# Patient Record
Sex: MALE | Race: WHITE | NOT HISPANIC OR LATINO | URBAN - METROPOLITAN AREA
[De-identification: names, ages, dates, MRNs, and addresses within clinical notes are randomized per-mention and may not be internally consistent; named-entity substitution may affect disease eponyms.]

---

## 2024-01-02 ENCOUNTER — APPOINTMENT (OUTPATIENT)
Dept: ORTHOPEDIC SURGERY | Facility: HOSPITAL | Age: 49
End: 2024-01-02
Payer: COMMERCIAL

## 2024-01-09 ENCOUNTER — APPOINTMENT (OUTPATIENT)
Dept: ORTHOPEDIC SURGERY | Facility: HOSPITAL | Age: 49
End: 2024-01-09
Payer: COMMERCIAL

## 2024-03-31 NOTE — PROGRESS NOTES
Subjective    Patient ID: Jerry Hassan is a 48 y.o. male.    Chief Complaint: No chief complaint on file.     Last Surgery: No surgery found  Last Surgery Date: No surgery found    HPI  JERRY HASSAN is a 46 year old right hand dominant male presenting today for evaluation of their left shoulder. He explains that he has had pain for a few months. He notes that he had another episode of the same pain five years ago that resolved on its own. He saw his PCP and was started on a round of antiinflammatories which did not help. Physical therapy did not help. He localized his pain to the AC joint. He has noticed the pain is exacerbated by working out and particularly with chest press.    04/02/24  Jerry returns to the clinic for a follow up visit regarding his left shoulder.     He reports that about a month ago his shoulder began getting worse. His wife signed them up for a  and they had him use bands. The night of working out he explains he went to bed and rolled over, then felt a sharp pain in his left shoulder.      Past medical history, surgical history, social history, and family history were all reviewed and are as per the [facility] patient health history questionnaire form that I signed and scanned into the chart today.   Objective   Ortho Exam  Patient is a well-developed, well-nourished male in no acute distress.  Breathes with normal chest rises.  Pupils are round and symmetric today.  Awake, alert, and oriented x3.      Examination of the right shoulder today reveals the skin to be intact. There is no sign of any atrophy, lesions, or abrasions. There is no pain to palpation of the bony prominences. Cervical lymphadenopathy examined, and this was negative. Patient had 5 out of 5 wrist flexion, extension, and thumb extension, bilaterally. Sensation was intact to light touch to median, ulnar, radial, axillary, and musculocutaneous nerves bilaterally. Positive radial pulse bilaterally.  Provocative maneuvers on the right side today were negative.  Range of motion of the right shoulder revealed 0-170° of forward elevation, 0-60° of external rotation, and internal rotation up to T-12.     Examination of the left shoulder today reveals the skin to be intact. There is no sign of any atrophy, lesions, or abrasions. There is no pain to palpation of the bony prominences. Cervical lymphadenopathy examined, and this was negative. Patient had 5 out of 5 wrist flexion, extension, and thumb extension bilaterally. Sensation was intact to light touch to median, ulnar, radial axillary, and musculocutaneous nerves bilaterally. Positive radial pulse bilaterally. Provocative maneuvers on the left side today were negative.  Range of motion of the left shoulder revealed 0-170° of forward elevation, 0-60° of external rotation, and internal rotation was to T-12.   Pain with Mert's testing.       Image Results:  SHOULDER  MRN: [id number]  Patient Name: JERRY HASSAN     STUDY:  Left shoulder, 4 views.     INDICATION:  left shoulder pain.  M25.512: Left shoulder pain.     COMPARISON:  None.     ACCESSION NUMBER(S):  [id number]     ORDERING CLINICIAN:  MCKAYLA BERMAN     FINDINGS:  No acute fracture or malalignment.  Resorptive changes of the distal clavicle likely representing distal  clavicle osteolysis.  No significant degenerative changes.  Soft tissues are unremarkable.     IMPRESSION:  1. Findings suggesting distal clavicle osteolysis/chronic repetitive  stress injury. Correlate with patient's overhead activity levels.      Assessment/Plan   Encounter Diagnoses:  No diagnosis found.  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     We discussed his shoulder again today. We provided him with exercises to do at home daily. He will follow up with us as needed if he finds no relief with the exercises.     No orders of the defined types were placed in this encounter.    Follow  up as needed    Scribe Attestation  By signing my name below, I, Belle Wilkerson , Scribemanuel   attest that this documentation has been prepared under the direction and in the presence of Jose Alejandro Connelly MD.

## 2024-04-02 ENCOUNTER — HOSPITAL ENCOUNTER (OUTPATIENT)
Dept: RADIOLOGY | Facility: HOSPITAL | Age: 49
Discharge: HOME | End: 2024-04-02
Payer: COMMERCIAL

## 2024-04-02 ENCOUNTER — OFFICE VISIT (OUTPATIENT)
Dept: ORTHOPEDIC SURGERY | Facility: HOSPITAL | Age: 49
End: 2024-04-02
Payer: COMMERCIAL

## 2024-04-02 VITALS — BODY MASS INDEX: 25.77 KG/M2 | HEIGHT: 70 IN | WEIGHT: 180 LBS

## 2024-04-02 DIAGNOSIS — M25.512 LEFT SHOULDER PAIN, UNSPECIFIED CHRONICITY: ICD-10-CM

## 2024-04-02 PROCEDURE — 73030 X-RAY EXAM OF SHOULDER: CPT | Mod: LT

## 2024-04-02 PROCEDURE — 99214 OFFICE O/P EST MOD 30 MIN: CPT | Performed by: ORTHOPAEDIC SURGERY

## 2024-04-02 PROCEDURE — 73030 X-RAY EXAM OF SHOULDER: CPT | Mod: LEFT SIDE | Performed by: RADIOLOGY

## 2024-04-02 RX ORDER — SERTRALINE HYDROCHLORIDE 50 MG/1
TABLET, FILM COATED ORAL EVERY 24 HOURS
COMMUNITY
Start: 2024-02-26

## 2024-04-02 NOTE — LETTER
April 6, 2024     Patient: Víctor Peter   YOB: 1975   Date of Visit: 4/2/2024       To Whom it May Concern:    Víctor Peter was seen in my clinic on 4/2/2024.    If you have any questions or concerns, please don't hesitate to call.         Sincerely,          Jose Alejandro Connelly MD        CC: No Recipients

## 2024-04-02 NOTE — Clinical Note
April 2, 2024     Patient: Víctor Peter   YOB: 1975   Date of Visit: 4/2/2024       To Whom It May Concern:    It is my medical opinion that Víctor Peter {Work release (duty restriction):[id number]}.    If you have any questions or concerns, please don't hesitate to call.         Sincerely,        Jose Alejandro Connelly MD    CC: No Recipients

## 2025-06-12 DIAGNOSIS — Z12.11 SCREENING FOR COLORECTAL CANCER: ICD-10-CM

## 2025-06-12 DIAGNOSIS — Z12.12 SCREENING FOR COLORECTAL CANCER: ICD-10-CM
